# Patient Record
Sex: FEMALE | Race: BLACK OR AFRICAN AMERICAN | NOT HISPANIC OR LATINO
[De-identification: names, ages, dates, MRNs, and addresses within clinical notes are randomized per-mention and may not be internally consistent; named-entity substitution may affect disease eponyms.]

---

## 2021-04-21 ENCOUNTER — DASHBOARD ENCOUNTERS (OUTPATIENT)
Age: 31
End: 2021-04-21

## 2021-04-21 ENCOUNTER — WEB ENCOUNTER (OUTPATIENT)
Dept: URBAN - METROPOLITAN AREA CLINIC 113 | Facility: CLINIC | Age: 31
End: 2021-04-21

## 2021-04-21 ENCOUNTER — OFFICE VISIT (OUTPATIENT)
Dept: URBAN - METROPOLITAN AREA CLINIC 113 | Facility: CLINIC | Age: 31
End: 2021-04-21
Payer: COMMERCIAL

## 2021-04-21 VITALS
WEIGHT: 205 LBS | SYSTOLIC BLOOD PRESSURE: 127 MMHG | BODY MASS INDEX: 36.32 KG/M2 | RESPIRATION RATE: 18 BRPM | HEIGHT: 63 IN | HEART RATE: 79 BPM | DIASTOLIC BLOOD PRESSURE: 81 MMHG | TEMPERATURE: 98.2 F

## 2021-04-21 DIAGNOSIS — R74.8 ELEVATED LIVER ENZYMES: ICD-10-CM

## 2021-04-21 DIAGNOSIS — K76.0 HEPATIC STEATOSIS: ICD-10-CM

## 2021-04-21 DIAGNOSIS — E88.81 METABOLIC SYNDROME: ICD-10-CM

## 2021-04-21 PROBLEM — 237602007: Status: ACTIVE | Noted: 2021-04-21

## 2021-04-21 PROBLEM — 197321007: Status: ACTIVE | Noted: 2021-04-21

## 2021-04-21 PROCEDURE — 99244 OFF/OP CNSLTJ NEW/EST MOD 40: CPT | Performed by: INTERNAL MEDICINE

## 2021-04-21 RX ORDER — HYDROCHLOROTHIAZIDE 25 MG/1
1 TABLET IN THE MORNING TABLET ORAL ONCE A DAY
Status: ACTIVE | COMMUNITY

## 2021-04-21 RX ORDER — ATORVASTATIN CALCIUM 80 MG/1
1 TABLET TABLET, FILM COATED ORAL ONCE A DAY
OUTPATIENT

## 2021-04-21 RX ORDER — HYDROCHLOROTHIAZIDE 25 MG/1
1 TABLET IN THE MORNING TABLET ORAL ONCE A DAY
OUTPATIENT

## 2021-04-21 RX ORDER — ATORVASTATIN CALCIUM 80 MG/1
1 TABLET TABLET, FILM COATED ORAL ONCE A DAY
Status: ACTIVE | COMMUNITY

## 2021-04-21 NOTE — HPI-TODAY'S VISIT:
Ms. Del Rio is a 30-year-old female with a history of hypertension, hyperlipidemia and obesity referred from Dr. Rosalia Baron for evaluation of elevated liver enzymes.   She is currently on Lipitor 80 mg daily Since August 2019 and HCTZ since August 2020.   She denies abdominal pain, juandice, nausea, vomiting, change in bowel habits, blood in the stool, fevers or chills.  No new medications or OTC supplements.  She has lost 40lbs over the last year with diet and exericse.  She tries to run 5 days a week.  No family history of colon cancer, inflammatory bowel disease GI cancers, peptic ulcer disease, cardiac disease or chronic liver disease.   The patient rings alcohol socially, but was previously a heavier drinker.  She uses marijuana daily, but denies IV drug use.  No tobacco use. She had repeat labs a few weeks ago with her PCP.  Abdominal ultrasound 8/5/20 revealed hepatic echogenicity and normal liver size, no mass identified, normal gallbladder.   Labs 6/25/20: BUN 7, creatinine 0.63, AST 82, , alk phos 58, T bili 0.3, albumin 3.9; WBC 8.2, hemoglobin 13.4, MCV 85, platelets 236; hepatitis C negative, hepatitis A antibody IgM negative, hepatitis B surface antigen negative, hepatitis BE core antibody IgM negative, hepatitis B surface antibody reactive, anti-smooth muscle antibody 4, FIDE negative, antimitochondrial antibody < 20, alpha-1 antitrypsin 158, ceruloplasmin 36.9, anti-tTG 5, anti-LKM 1.5, iron 70, TIBC 439, iron saturation 16, Ferritin 27,hemoglobin A1c 5.6; cholesterol 194, triglycerides 101, HDL 32,  7/30/20: AST 53, , alk phos 58, T bili 0.2, albumin 4  A copy of this document is being forwarded to the referring provider.